# Patient Record
Sex: FEMALE | Race: WHITE | NOT HISPANIC OR LATINO | Employment: UNEMPLOYED | URBAN - METROPOLITAN AREA
[De-identification: names, ages, dates, MRNs, and addresses within clinical notes are randomized per-mention and may not be internally consistent; named-entity substitution may affect disease eponyms.]

---

## 2022-04-10 ENCOUNTER — HISTORICAL (OUTPATIENT)
Dept: ADMINISTRATIVE | Facility: HOSPITAL | Age: 62
End: 2022-04-10

## 2022-04-26 VITALS
WEIGHT: 155 LBS | HEIGHT: 63 IN | SYSTOLIC BLOOD PRESSURE: 102 MMHG | DIASTOLIC BLOOD PRESSURE: 78 MMHG | BODY MASS INDEX: 27.46 KG/M2

## 2022-09-02 LAB
PAP RECOMMENDATION EXT: NORMAL
PAP SMEAR: NORMAL

## 2022-09-30 LAB — BCS RECOMMENDATION EXT: NORMAL

## 2023-02-02 ENCOUNTER — DOCUMENTATION ONLY (OUTPATIENT)
Dept: ADMINISTRATIVE | Facility: HOSPITAL | Age: 63
End: 2023-02-02

## 2023-09-21 ENCOUNTER — OFFICE VISIT (OUTPATIENT)
Dept: OBSTETRICS AND GYNECOLOGY | Facility: CLINIC | Age: 63
End: 2023-09-21
Payer: COMMERCIAL

## 2023-09-21 VITALS
HEART RATE: 76 BPM | WEIGHT: 154.63 LBS | RESPIRATION RATE: 18 BRPM | BODY MASS INDEX: 27.4 KG/M2 | DIASTOLIC BLOOD PRESSURE: 78 MMHG | OXYGEN SATURATION: 99 % | SYSTOLIC BLOOD PRESSURE: 122 MMHG | HEIGHT: 63 IN

## 2023-09-21 DIAGNOSIS — Z12.31 SCREENING MAMMOGRAM FOR BREAST CANCER: ICD-10-CM

## 2023-09-21 DIAGNOSIS — Z01.419 WOMEN'S ANNUAL ROUTINE GYNECOLOGICAL EXAMINATION: Primary | ICD-10-CM

## 2023-09-21 PROCEDURE — 3008F BODY MASS INDEX DOCD: CPT | Mod: CPTII,,, | Performed by: OBSTETRICS & GYNECOLOGY

## 2023-09-21 PROCEDURE — 3074F PR MOST RECENT SYSTOLIC BLOOD PRESSURE < 130 MM HG: ICD-10-PCS | Mod: CPTII,,, | Performed by: OBSTETRICS & GYNECOLOGY

## 2023-09-21 PROCEDURE — 99396 PR PREVENTIVE VISIT,EST,40-64: ICD-10-PCS | Mod: ,,, | Performed by: OBSTETRICS & GYNECOLOGY

## 2023-09-21 PROCEDURE — 1160F PR REVIEW ALL MEDS BY PRESCRIBER/CLIN PHARMACIST DOCUMENTED: ICD-10-PCS | Mod: CPTII,,, | Performed by: OBSTETRICS & GYNECOLOGY

## 2023-09-21 PROCEDURE — 3008F PR BODY MASS INDEX (BMI) DOCUMENTED: ICD-10-PCS | Mod: CPTII,,, | Performed by: OBSTETRICS & GYNECOLOGY

## 2023-09-21 PROCEDURE — 1160F RVW MEDS BY RX/DR IN RCRD: CPT | Mod: CPTII,,, | Performed by: OBSTETRICS & GYNECOLOGY

## 2023-09-21 PROCEDURE — 1159F PR MEDICATION LIST DOCUMENTED IN MEDICAL RECORD: ICD-10-PCS | Mod: CPTII,,, | Performed by: OBSTETRICS & GYNECOLOGY

## 2023-09-21 PROCEDURE — 3078F PR MOST RECENT DIASTOLIC BLOOD PRESSURE < 80 MM HG: ICD-10-PCS | Mod: CPTII,,, | Performed by: OBSTETRICS & GYNECOLOGY

## 2023-09-21 PROCEDURE — 1159F MED LIST DOCD IN RCRD: CPT | Mod: CPTII,,, | Performed by: OBSTETRICS & GYNECOLOGY

## 2023-09-21 PROCEDURE — 99396 PREV VISIT EST AGE 40-64: CPT | Mod: ,,, | Performed by: OBSTETRICS & GYNECOLOGY

## 2023-09-21 PROCEDURE — 3078F DIAST BP <80 MM HG: CPT | Mod: CPTII,,, | Performed by: OBSTETRICS & GYNECOLOGY

## 2023-09-21 PROCEDURE — 3074F SYST BP LT 130 MM HG: CPT | Mod: CPTII,,, | Performed by: OBSTETRICS & GYNECOLOGY

## 2023-09-21 RX ORDER — NYSTATIN 100000 [USP'U]/ML
SUSPENSION ORAL
COMMUNITY
Start: 2023-07-06

## 2023-09-21 RX ORDER — ESCITALOPRAM OXALATE 20 MG/1
20 TABLET ORAL
COMMUNITY
Start: 2023-09-08

## 2023-09-21 RX ORDER — FLUTICASONE FUROATE, UMECLIDINIUM BROMIDE AND VILANTEROL TRIFENATATE 200; 62.5; 25 UG/1; UG/1; UG/1
1 POWDER RESPIRATORY (INHALATION)
COMMUNITY
Start: 2023-09-08

## 2023-09-21 RX ORDER — VALSARTAN AND HYDROCHLOROTHIAZIDE 320; 25 MG/1; MG/1
1 TABLET, FILM COATED ORAL
COMMUNITY
Start: 2023-09-08

## 2023-09-21 NOTE — PROGRESS NOTES
"Patient ID: [id number]   Chief Complaint: Annual exam  Chief Complaint   Patient presents with    Well Woman     No c/o's.     HPI:   Cheryl Parker is a 62 y.o. year old  here for her Annual Exam. No LMP recorded. Patient is postmenopausal. She is doing well. Denies any health changes. Well Woman (No c/o's.)    Subjective:     Past Medical History:   Diagnosis Date    Abnormal Pap smear of cervix     Asthma     Hypertension      Past Surgical History:   Procedure Laterality Date    CHOLECYSTECTOMY       Social History     Tobacco Use    Smoking status: Never     Passive exposure: Never    Smokeless tobacco: Never   Substance Use Topics    Alcohol use: Yes     Comment: social    Drug use: Never     Family History   Problem Relation Age of Onset    Hypertension Father     Hypertension Mother     Diabetes Mother      OB History    Para Term  AB Living   2 2 2     2   SAB IAB Ectopic Multiple Live Births           2      # Outcome Date GA Lbr Bandar/2nd Weight Sex Delivery Anes PTL Lv   2 Term 89 40w0d   F Vag-Spont EPI  PRACHI   1 Term 89 40w0d   M Vag-Spont EPI  PRACHI       Current Outpatient Medications:     EScitalopram oxalate (LEXAPRO) 20 MG tablet, Take 20 mg by mouth., Disp: , Rfl:     nystatin (MYCOSTATIN) 100,000 unit/mL suspension, Take by mouth., Disp: , Rfl:     TRELEGY ELLIPTA 200-62.5-25 mcg inhaler, Inhale 1 puff into the lungs., Disp: , Rfl:     valsartan-hydrochlorothiazide (DIOVAN-HCT) 320-25 mg per tablet, Take 1 tablet by mouth., Disp: , Rfl:   MENARCHEAL:  Post menopausal  PAP:  Last PAP: 2022    History of Abnormal PAP Smear: YES , STATES" YEARS AGO"       Treated: N/A  HPV Vaccine: NO  INTERCOURSE:  Dyspareunia: No  Postcoital Bleeding: No  History of STI: No   If yes, then: No   Current Birth Control Method: post menopausal status  Sexually Active: no  BREAST HISTORY:   Last Mammogram: 2022  History of Abnormal Mammogram: NO  MENOPAUSE:  Post Menopausal " "Bleeding: No  Hormone Replacement Therapy: No  COLONOSCOPY:  Last Colonoscopy:   2016    Offered patient STD testing via pap smear and she declined    Review of Systems 12 point review of systems conducted, negative except as stated in the history of present illness. See HPI for details.  Objective:   Visit Vitals  /78 (BP Location: Left arm)   Pulse 76   Resp 18   Ht 5' 3" (1.6 m)   Wt 70.1 kg (154 lb 9.6 oz)   SpO2 99%   BMI 27.39 kg/m²     No results found for this or any previous visit (from the past 24 hour(s)).  Physical Exam:  Physical Exam  Constitutional:  General Appearance : alert, in no acute distress, normal, well nourished.  Respiratory:  Respiratory Effort: normal.  Breast:  Right: Inspection/palpation: no discharge, no masses present, no nipple retraction, no skin changes, no skin dimpling, no tenderness, no lymphadenopathy, no axillary mass, no axillary tenderness.  Left: Inspection/palpation: no discharge, no masses present, no nipple retraction, no skin changes, no skin dimpling, no tenderness, no lymphadenopathy, no axillary mass, no axillary tenderness.  Gastrointestinal:  Abdomen: no masses. no tender, nondistended.  Liver and spleen: normal  Hernias: no hernias present, no inguinal adenopathy.  Genitourinary:  External Genitalia: normal, no lesions.  Vagina: normal appearance, no abnormal discharge, no lesions.  Bladder: no mass, nontender.  Urethra: no erythema or lesions present.  Cervix: no lesions, non tender. Pap Done  Uterus: nontender, normal contour, normal mobility, normal size.   Adnexa: no masses, no tenderness.  Anus and Perineum: visually normal.   Chaperone Present  No results found for this or any previous visit (from the past 24 hour(s)).  Assessment/Plan:   Assessment:   Women's annual routine gynecological examination  -     Liquid-Based Pap Smear, Screening Screening    Screening mammogram for breast cancer  -     Mammo Digital Screening Bilat; Future; Expected date: " 09/21/2023      Follow up in about 1 year (around 9/21/2024) for ANNUAL. In addition to their scheduled FU, the patient has also been instructed to follow up on as needed basis. All questions were answered and the patient voiced understanding of the above issues.

## 2023-09-25 LAB — PSYCHE PATHOLOGY RESULT: NORMAL

## 2024-10-01 ENCOUNTER — OFFICE VISIT (OUTPATIENT)
Dept: OBSTETRICS AND GYNECOLOGY | Facility: CLINIC | Age: 64
End: 2024-10-01
Payer: COMMERCIAL

## 2024-10-01 VITALS
SYSTOLIC BLOOD PRESSURE: 120 MMHG | BODY MASS INDEX: 24.55 KG/M2 | WEIGHT: 138.63 LBS | HEART RATE: 74 BPM | DIASTOLIC BLOOD PRESSURE: 80 MMHG

## 2024-10-01 DIAGNOSIS — Z01.419 WOMEN'S ANNUAL ROUTINE GYNECOLOGICAL EXAMINATION: Primary | ICD-10-CM

## 2024-10-01 DIAGNOSIS — Z12.31 SCREENING MAMMOGRAM FOR BREAST CANCER: ICD-10-CM

## 2024-10-01 PROCEDURE — 1160F RVW MEDS BY RX/DR IN RCRD: CPT | Mod: CPTII,,, | Performed by: NURSE PRACTITIONER

## 2024-10-01 PROCEDURE — 1159F MED LIST DOCD IN RCRD: CPT | Mod: CPTII,,, | Performed by: NURSE PRACTITIONER

## 2024-10-01 PROCEDURE — 3074F SYST BP LT 130 MM HG: CPT | Mod: CPTII,,, | Performed by: NURSE PRACTITIONER

## 2024-10-01 PROCEDURE — 3008F BODY MASS INDEX DOCD: CPT | Mod: CPTII,,, | Performed by: NURSE PRACTITIONER

## 2024-10-01 PROCEDURE — 3079F DIAST BP 80-89 MM HG: CPT | Mod: CPTII,,, | Performed by: NURSE PRACTITIONER

## 2024-10-01 PROCEDURE — 99396 PREV VISIT EST AGE 40-64: CPT | Mod: ,,, | Performed by: NURSE PRACTITIONER

## 2024-10-01 RX ORDER — AMLODIPINE BESYLATE 5 MG/1
5 TABLET ORAL DAILY
COMMUNITY

## 2024-10-01 RX ORDER — HYDROCHLOROTHIAZIDE 25 MG/1
25 TABLET ORAL DAILY
COMMUNITY

## 2024-10-01 RX ORDER — FLUTICASONE FUROATE, UMECLIDINIUM BROMIDE AND VILANTEROL TRIFENATATE 100; 62.5; 25 UG/1; UG/1; UG/1
1 POWDER RESPIRATORY (INHALATION)
COMMUNITY
Start: 2024-09-30

## 2024-10-01 NOTE — PROGRESS NOTES
Patient ID: [id number]   Chief Complaint: Annual exam  Chief Complaint   Patient presents with    Annual Exam     NO C/O'S     HPI:   Cheryl Parker is a 63 y.o. year old  here for her Annual Exam.   No LMP recorded. Patient is postmenopausal.   She is doing well. Denies any health changes.   Annual Exam (NO C/O'S)    Subjective:     Past Medical History:   Diagnosis Date    Abnormal Pap smear of cervix     Asthma     Hypertension      Past Surgical History:   Procedure Laterality Date    CHOLECYSTECTOMY      COLD KNIFE CONIZATION OF CERVIX      CRYO  OF CERVIX      HEMORROIDECTOMY       Social History     Tobacco Use    Smoking status: Never     Passive exposure: Never    Smokeless tobacco: Never   Substance Use Topics    Alcohol use: Yes     Comment: social    Drug use: Never     Family History   Problem Relation Name Age of Onset    Hypertension Father      Hypertension Mother      Diabetes Mother      Hypertension Brother      Diabetes Sister      Stroke Sister      Hypertension Sister       OB History    Para Term  AB Living   2 2 2     2   SAB IAB Ectopic Multiple Live Births           2      # Outcome Date GA Lbr Bandar/2nd Weight Sex Type Anes PTL Lv   2 Term 89 40w0d   F Vag-Spont EPI  PRACHI   1 Term 89 40w0d   M Vag-Spont EPI  PRACHI       Current Outpatient Medications:     amLODIPine (NORVASC) 5 MG tablet, Take 5 mg by mouth once daily., Disp: , Rfl:     EScitalopram oxalate (LEXAPRO) 20 MG tablet, Take 20 mg by mouth., Disp: , Rfl:     hydroCHLOROthiazide (HYDRODIURIL) 25 MG tablet, Take 25 mg by mouth once daily., Disp: , Rfl:     TRELEGY ELLIPTA 100-62.5-25 mcg DsDv, Inhale 1 puff into the lungs., Disp: , Rfl:   MENARCHEAL:  POST MENOPAUSAL  PAP:  Last PAP: 23  History of Abnormal PAP Smear: YES: MILD DYPLASIA   Treated: [facility]  HPV Vaccine: NO  INTERCOURSE:  NOT SEXUALLY ACTIVE AT PRESENT  History of STI: No  Current Birth Control Method: post menopausal status  Sexually  Active: no  BREAST HISTORY:   Last Mammogram: 9-21-24  History of Abnormal Mammogram: NO  MENOPAUSE:  Post Menopausal Bleeding: No  Hormone Replacement Therapy: No  COLONOSCOPY:  Last Colonoscopy: 6-14-24 WNL            Review of Systems 12 point review of systems conducted, negative except as stated in the history of present illness.     See HPI for details.  Objective:   Visit Vitals  /80 (BP Location: Left arm, Patient Position: Sitting)   Pulse 74   Wt 62.9 kg (138 lb 9.6 oz)   BMI 24.55 kg/m²     No results found for this or any previous visit (from the past 24 hours).  Physical Exam:  Chaperone present for exam.  Physical Exam  Constitutional:  General Appearance : alert, in no acute distress, normal, well nourished.  Cardiovascular:   Regular rate and rhythm.  Respiratory:  Respiratory Effort: normal.  Breast:  Right: Inspection/palpation: no discharge, no masses present, no nipple retraction, no skin changes, no skin dimpling, no tenderness, no lymphadenopathy, no axillary mass, no axillary tenderness.  Left: Inspection/palpation: no discharge, no masses present, no nipple retraction, no skin changes, no skin dimpling, no tenderness, no lymphadenopathy, no axillary mass, no axillary tenderness.  Gastrointestinal:  Abdomen: no masses. no tender, nondistended.  Genitourinary:  External Genitalia: normal, no lesions.  Vagina: normal appearance, no abnormal discharge, no lesions.  Bladder: no mass, nontender.  Urethra: no erythema or lesions present.  Cervix: no lesions, non tender. Pap Done  Uterus: nontender, normal contour, normal mobility, normal size.   Adnexa: no masses, no tenderness.  Anus and Perineum: visually normal.     No results found for this or any previous visit (from the past 24 hours).  Assessment/Plan:   Assessment:   Women's annual routine gynecological examination  -     Liquid-Based Pap Smear, Screening    Screening mammogram for breast cancer  -     Mammo Digital Screening Bilat;  Future; Expected date: 10/10/2024      Follow up in about 1 year (around 10/1/2025) for ANNUAL.     In addition to their scheduled follow-up, the patient has also been instructed to follow up on as needed basis.   All questions were answered and the patient voiced understanding of the above issues.